# Patient Record
Sex: MALE | Race: WHITE | NOT HISPANIC OR LATINO | URBAN - METROPOLITAN AREA
[De-identification: names, ages, dates, MRNs, and addresses within clinical notes are randomized per-mention and may not be internally consistent; named-entity substitution may affect disease eponyms.]

---

## 2017-01-01 ENCOUNTER — OFFICE VISIT - RIVER FALLS (OUTPATIENT)
Dept: FAMILY MEDICINE | Facility: CLINIC | Age: 82
End: 2017-01-01

## 2017-01-01 ENCOUNTER — COMMUNICATION - RIVER FALLS (OUTPATIENT)
Dept: FAMILY MEDICINE | Facility: CLINIC | Age: 82
End: 2017-01-01

## 2017-01-01 ENCOUNTER — AMBULATORY - RIVER FALLS (OUTPATIENT)
Dept: FAMILY MEDICINE | Facility: CLINIC | Age: 82
End: 2017-01-01

## 2017-01-01 LAB
CREAT SERPL-MCNC: 0.62 MG/DL (ref 0.7–1.18)
GLUCOSE BLD-MCNC: 108 MG/DL (ref 65–99)

## 2017-01-01 ASSESSMENT — MIFFLIN-ST. JEOR
SCORE: 1468.5
SCORE: 1502.06
SCORE: 1482.11
SCORE: 1477.57

## 2022-02-11 VITALS
DIASTOLIC BLOOD PRESSURE: 82 MMHG | WEIGHT: 179 LBS | WEIGHT: 182 LBS | HEART RATE: 76 BPM | SYSTOLIC BLOOD PRESSURE: 150 MMHG | DIASTOLIC BLOOD PRESSURE: 52 MMHG | OXYGEN SATURATION: 95 % | BODY MASS INDEX: 27.58 KG/M2 | WEIGHT: 186.4 LBS | SYSTOLIC BLOOD PRESSURE: 132 MMHG | HEART RATE: 88 BPM | TEMPERATURE: 97 F | HEART RATE: 77 BPM | DIASTOLIC BLOOD PRESSURE: 70 MMHG | TEMPERATURE: 97.1 F | HEIGHT: 68 IN | SYSTOLIC BLOOD PRESSURE: 138 MMHG | BODY MASS INDEX: 28.25 KG/M2 | TEMPERATURE: 98.1 F | DIASTOLIC BLOOD PRESSURE: 76 MMHG | SYSTOLIC BLOOD PRESSURE: 124 MMHG | HEART RATE: 73 BPM | SYSTOLIC BLOOD PRESSURE: 148 MMHG | DIASTOLIC BLOOD PRESSURE: 77 MMHG | BODY MASS INDEX: 27.13 KG/M2 | OXYGEN SATURATION: 99 % | HEIGHT: 68 IN | TEMPERATURE: 98.2 F | HEART RATE: 82 BPM | HEIGHT: 68 IN

## 2022-02-11 VITALS
TEMPERATURE: 96.3 F | DIASTOLIC BLOOD PRESSURE: 66 MMHG | BODY MASS INDEX: 27.43 KG/M2 | HEART RATE: 89 BPM | WEIGHT: 181 LBS | SYSTOLIC BLOOD PRESSURE: 125 MMHG | HEIGHT: 68 IN

## 2022-02-11 VITALS — TEMPERATURE: 97.7 F | HEART RATE: 85 BPM | SYSTOLIC BLOOD PRESSURE: 126 MMHG | DIASTOLIC BLOOD PRESSURE: 79 MMHG

## 2022-02-16 NOTE — CARE COORDINATION
ACTION PLAN   Goal(s): manage chronic conditions    Using [facility] (Emily [phone]) . CC to f/u PRN-Gisela (daughter) calls when she needs something    Today s Date: 8/25/16                                                              Goal(s) completion date: ongoing     Steps to be taken to manage care at home When will I accomplish these steps Barriers Status   (4/13/17) Enrolling in Palliative care        Steps to be taken manage pain When will I accomplish these steps Barriers Status   Tylenol and Tramadol   (4/19/17) Tramadol 50mg q12 hrs rx'd in addition to his Tylenol. F/u due 6/12/17    (5/1/17) Tramadol increased to 50mg TID on 4/28/17     Steps to be taken manage depression When will I accomplish these steps Barriers Status   (4/19/17) Started on Fluoxetine 10mg qd          Steps to be taken manage breathing concerns When will I accomplish these steps Barriers Status   (4/13/17) Gisela states that Emery will have episodes of SOB. Referral placed for Pulm.    (4/19/17) Appt scheduled w/ Dr. Chen in [place] on 5/15/17 at 9am  (CC to make sure we receive records)       Steps to be taken manage Parkinsons When will I accomplish these steps Barriers Status   Continue on Sinemet 25/100 QID. F/u in 3 mo (6/22/17)          Steps to be taken manage BP When will I accomplish these steps Barriers Status   4/17 Per [facility] OK w/ systolic BP's of 160. Cont. Florinef to prevent low BPs.          Steps to be taken to manage knee pain When will I accomplish these steps Barriers Status   4/19 Agreed to ortho referral for possible injection   (4/19/17) Appt scheduled w/ Dr. Ochoa at [facility] on 5/9/17 at 830am    (5/10/17) Appt completed and will be doing steroid injections. Will try to wean down on tramadol. (CC to make sure we receive records of visit)

## 2022-02-16 NOTE — CARE COORDINATION
Gisela called CC today    1. Wants to make sure we received notes from Emery's surgery (we did)    2. Wants a refill for Cymbalta x 90 days. (Filled for # 90 on 6/8 w/ 3 refills    3. Emery seeing [facility] in [place] today and she wants CC to make sure we receive this note for next weeks f/u. (CC will check back on this).    LM for Gisela letting her know above.Spoke to [facility] and requested they fax Emery's records. They will do this.received records from [facility]. Records sent back to be scanned in chart.

## 2022-02-16 NOTE — CARE COORDINATION
ACTION PLAN   Goal(s): manage chronic conditions    Using [facility] (Emily [phone]) . CC to f/u PRN-Gisela (daughter) calls when she needs something    Today s Date: 8/25/16                                                              Goal(s) completion date: ongoing     Steps to be taken to manage care at home When will I accomplish these steps Barriers Status   (4/13/17) Enrolling in Palliative care        Steps to be taken manage pain When will I accomplish these steps Barriers Status   (4/13/17) Currently taking Oxycodone 5mg 1-2 tabs q6 hrs and per Emily had been taking 6-8 tabs of tylenol per day. Requesting a long acting pain med (Oxycontin 10mg qd 12 hrs). Had Gisela set up an appt with [facility] for this (4/17/17)    ***Denies ortho referral at this time***          Steps to be taken manage breathing concerns When will I accomplish these steps Barriers Status   (4/13/17) Gisela states that Emery will have episodes of SOB. Referral placed for Pulm.           Steps to be taken manage Parkinsons When will I accomplish these steps Barriers Status   Continue on Sinemet 25/100 QID. F/u in 3 mo (6/22/17)          Steps to be taken manage BP When will I accomplish these steps Barriers Status   4/13/17 Per [facility] OK w/ systolic BP's of 160. Cont. Florinef to prevent low BPs.

## 2022-02-16 NOTE — NURSING NOTE
PCP:   EDDIE      Time of Call:  _0916       Person Calling:  Pt daughter      Note:   _Spoke to daughter directly. She states pt has been awake crying in pain since 1 am. Tylenol is not helping, may be running a fever. Crying is not typical for pt. Daughter wondering if she should make appt or take pt to ER.  Advised daughter to take pt to ER due to severity of pain and possibly not being able to get appt until later in morning or afternoon. Daughter agreed.  ER was notified of pt coming.

## 2022-02-16 NOTE — PROGRESS NOTES
Patient:   GIACOMO POOLE            MRN: [id number]            FIN: [id number]               Age:   81 years     Sex:  Male     :  1935   Associated Diagnoses:   Hyperlipidemia; Hypertensive disorder; Parkinson disease; Depression; Localized osteoarthritis of both knees   Author:   Lazaro Fisher MD      Visit Information      Date of Service: 2017 11:01 am  Performing Location: [facility]  Encounter#: [id number]      Primary Care Provider (PCP):  Lazaro Fisher MD    NPI# [id number]      Referring Provider:  No referring provider recorded for selected visit.      Chief Complaint   2017 11:03 AM CDT   1. F/u after uro appt. 2. Discuss pain management. Pain in knees and joints-tramadol not helping. Currently taking Tramadol QID, also taking ibuprofen and tylenol.      History of Present Illness   chief complaint and symptoms as noted above confirmed with patient   Struggles with pain controll demetria djd knees and perif neuropathy  Tamadol does not help  Wants oxycodone back  will need uro surgery soon  now has karan         Review of Systems   Constitutional:  Negative except as documented in history of present illness.    Eye:  Negative.    Ear/Nose/Mouth/Throat:  Negative except as documented in history of present illness.    Respiratory:  Negative.    Cardiovascular:  Negative.    Gastrointestinal:  Negative.    Genitourinary:  Negative except as documented in history of present illness.    Musculoskeletal:  Negative except as documented in history of present illness.    Integumentary:  Negative.    Neurologic:  Negative except as documented in history of present illness.              Health Status   Allergies:    Allergic Reactions (Selected)  No Known Medication Allergies   Medications:  (Selected)   Prescriptions  Prescribed  Cymbalta 30 mg oral delayed release capsule: 1 cap(s) ( 30 mg ), po, daily, # 30 cap(s), 1 Refill(s), Type: Maintenance, Pharmacy: [email or web address]  [id number], 1 cap(s) po daily  Florinef Acetate 0.1 mg oral tablet: 1 tab(s) ( 0.1 mg ), PO, Daily, # 90 tab(s), 3 Refill(s), Type: Maintenance, [facility] [address], 1 tab(s) po daily  Nystop 100,000 units/g topical powder: 1 thomas, top, tid, # 30 gm, 11 Refill(s), Type: Maintenance, [facility] [address], 1 thomas top tid  Symbicort 160 mcg-4.5 mcg/inh inhalation aerosol: 2 puff(s), inh, bid, # 3 EA, 1 Refill(s), Type: Maintenance, Pharm[facility] [address], 2 puff(s) inh bid  Ventolin HFA 90 mcg/inh inhalation aerosol: 2 puff(s), inh, q4 hrs, # 3 EA, 1 Refill(s), Type: Maintenance, [facility] [address], 2 puff(s) inh q4 hrs  carbidopa-levodopa 25 mg-100 mg oral tablet: 2 tab(s), po, qid, # 720 tab(s), 1 Refill(s), Type: Maintenance, Pharmacy: [facility] [address], 2 tab(s) po qid  diclofenac 1% topical gel: ( 2 gm ), top, qid, Instructions: not to exceed 32 grams/day, # 240 gm, 3 Refill(s), Type: Maintenance, Pharm[facility] [address], 2 gm top qid,Instr:not to exceed 32 grams/day  fluconazole 150 mg oral tablet: See Instructions, Instructions: TAKE 1 TABLET BY MOUTH ONCE, REPEAT IN 3 DAYS, # 5 tab(s), Type: Soft Stop, Pharmacy: [facility] [address]  gabapentin 400 mg oral capsule: 1 cap(s) ( 400 mg ), po, tid, # 270 cap(s), 1 Refill(s), Type: Soft Stop, Pharmacy: [facility] [address], 1 cap(s) po tid  ketoconazole 2% topical cream: 1 thomas, top, daily, # 60 gm, 11 Refill(s), Type: Maintenance, [facility] [address], 1 thomas top daily  omeprazole 20 mg oral delayed release capsule: 1 cap(s) ( 20 mg ), po, daily, # 90 cap(s), 1 Refill(s), Type: Maintenance, Pharmacy: [facility] [address], 1 cap(s) po daily  oxyCODONE 5 mg oral capsule: 1 cap(s) ( 5 mg ), PO, q6hr, PRN: for pain, # 30 cap(s), 0 Refill(s), Type: Maintenance  sulfamethoxazole-trimethoprim 400 mg-80 mg oral tablet: 1 tab(s), po, daily, # 90 tab(s), 1  Refill(s), Type: Soft Stop, Pharmacy: [facility] [address], 1 tab(s) po daily  Documented Medications  Documented  Benefiber: po, bid, 0 Refill(s), Type: Maintenance  Dulcolax Stool Softener: ( 100 mg ), po, daily, 0 Refill(s), Type: Maintenance  MiraLax: ( 17 gm ), po, daily, PRN: for constipation, 0 Refill(s), Type: Maintenance  Multiple Vitamins oral tablet: 1 tab(s), po, daily, 0 Refill(s), Type: Maintenance  Probiotic Formula oral capsule: 1 cap(s), po, daily, 0 Refill(s), Type: Maintenance  Tylenol Arthritis Caplet: 2 tab(s), po, 1-3x/day, PRN: for pain, 0 Refill(s), Type: Maintenance  aspirin 81 mg oral tablet: 1 tab(s) ( 81 mg ), PO, Daily, # 30 tab(s), 0 Refill(s), Type: Maintenance  melatonin 10 mg oral capsule: 2-3 cap(s), po, hs, 0 Refill(s), Type: Maintenance   Problem list:    All Problems  History of blood transfusion / SNOMED CT 083106254 / Confirmed  History of urinary system disease / SNOMED CT 202849026 / Confirmed  History of elevated PSA / SNOMED CT 217398619 / Confirmed  Nodular prostate without urinary obstruction / SNOMED CT 5854750221 / Confirmed  Urethral stricture / SNOMED CT 213034259 / Confirmed  Stenosis of urinary meatus / SNOMED CT 107391225 / Confirmed  Acne rosacea / SNOMED CT 0608274447 / Confirmed  Actinic keratosis / SNOMED CT 3749375472 / Confirmed  BPH (benign prostatic hypertrophy) / SNOMED CT 507648269 / Confirmed  Chronic obstructive lung disease / SNOMED CT 67949537 / Confirmed  Hyperlipidemia / SNOMED CT 32170410 / Confirmed  Hypertensive disorder / SNOMED CT 7105211032 / Confirmed  Impotence / SNOMED CT 5938261977 / Confirmed  Sensorineural hearing loss / SNOMED CT 562021591 / Confirmed  Tinnitus / SNOMED CT 574289688 / Confirmed  Multilevel spinal stenosis / SNOMED CT 831250858 / Confirmed  Parkinson disease / SNOMED CT 2360829823 / Confirmed  Arthritis / SNOMED CT 6532171661 / Confirmed  Sleep apnea / SNOMED CT 784619773 / Confirmed  Polyneuropathy / SNOMED CT  3976515702 / Confirmed  Depression / SNOMED CT 4260852612 / Confirmed      Histories   Past Medical History:    Active  Nodular prostate without urinary obstruction (5711624049): Onset on 2015 at 79 years.  History of blood transfusion (945635519)  History of urinary system disease (682544531)  History of elevated PSA (024785088)  Urethral stricture (781817179)  Stenosis of urinary meatus (299648549)  Acne rosacea (6953942602)  Actinic keratosis (3299330196)  BPH (benign prostatic hypertrophy) (085626908)  Chronic obstructive lung disease (46997575)  Hyperlipidemia (15923489)  Hypertensive disorder (3370542203)  Impotence (7495819151)  Sensorineural hearing loss (092295611)  Tinnitus (373188099)  Multilevel spinal stenosis (963945482)  Comments:  2016 CST 4:46 PM CST - Nicole Rowe  Most pronounced at L3-4 with grade 1 spondylolisthesis  Arthritis (1205663326)  Sleep apnea (619551980)  Polyneuropathy (9522468654)   Family History:    Lewy body dementia  Sister  High blood pressure  Father ()  Stroke  Sister  Renal failure.....  Mother ()     Procedure history:    urethral dilation on 2012 at 76 Years.  cystoscopy on 2010 at 75 Years.  Cystoscopy (SNOMED CT 93013161) in the month of 2009 at 73 Years.  Colonoscopy (SNOMED CT 271359818) on 10/31/2002 at 66 Years.  Colonoscopy (SNOMED CT 501668008) on 10/31/2002 at 66 Years.   Social History:        Alcohol Assessment            1-2 times per week                     Comments:                      2016 - Khadijah Amin                     1 to 2 per time                       2016 - Lauren Hamm                     4 drinks per day      Tobacco Assessment: Current            Cigarettes      Substance Abuse Assessment: Denies Substance Abuse      Employment and Education Assessment            Retired      Home and Environment Assessment            Marital status: .      Nutrition and Health Assessment             Caffeine intake amount: 2 caffeinated drinks per day.      Exercise and Physical Activity Assessment: Does not exercise        Physical Examination   Vital Signs   5/31/2017 11:03 AM CDT Temperature Tympanic 98.2 DegF    Peripheral Pulse Rate 77 bpm    Systolic Blood Pressure 150 mmHg  HI    Diastolic Blood Pressure 76 mmHg    Mean Arterial Pressure 101 mmHg    BP Site Right arm    Oxygen Saturation 95 %      Measurements from flowsheet : Measurements   5/31/2017 11:03 AM CDT Height Measured - Standard 67.5 in    Weight Measured - Standard 179 lb    BSA 1.96 m2    Body Mass Index 27.62 kg/m2      General:  Alert and oriented, No acute distress, No qualifying data available.   .    Neck:  Supple, Non-tender.    Respiratory:  Lungs are clear to auscultation, Respirations are non-labored.    Cardiovascular:  Normal rate, Regular rhythm.    Gastrointestinal:  Soft.    Musculoskeletal:  degenerative changes noted.    Integumentary:  No rash.    Neurologic:  Alert, Oriented, adv parkinsons.       Impression and Plan   Diagnosis     Hyperlipidemia (OGL87-JS E78.5).     Hypertensive disorder (LXA23-AB I10).     Parkinson disease (WSD23-GX G20).     Depression (YPA84-CK F32.9).     Localized osteoarthritis of both knees (CPO69-EN M17.0).     Course:  Worsening.    Plan:  add cymbalta  oxycodone use reviewed csa signed Discussed [place] etc  wpddb reviewed       30 min spent Face-to-face     .    Patient Instructions:       Counseled: Patient, Family, Regarding diagnosis, Regarding treatment, Regarding medications.

## 2022-02-16 NOTE — PROGRESS NOTES
Patient:   GIACOMO POOLE            MRN: [id number]            FIN: [id number]               Age:   81 years     Sex:  Male     :  1935   Associated Diagnoses:   Hyperlipidemia; Hypertensive disorder; Parkinson disease   Author:   Lazaro Fisher MD      Visit Information      Date of Service: 2017 04:39 pm  Performing Location: [facility]  Encounter#: [id number]      Primary Care Provider (PCP):  Lazaro Fisher MD    NPI# [id number]      Referring Provider:  No referring provider recorded for selected visit.      Chief Complaint   3/8/2017 4:50 PM CST     Pt c/o fatigue, weakness - in wheelchair all the time now. Also last 10 days loose stools - no blood in stool but stool is darker brown than normal. No abdominal pain.        History of Present Illness   chief complaint and symptoms as noted above confirmed with patient   Worsening parkinsons  Overall getting weaker.  No confusion.  His steroid is helping his low blood pressures nicely.  He has had more issues with bowel control unable to tell when he has to go.  He does go twice a day.  Taking medications as directed.  No further bladder infection.          Review of Systems   Constitutional:  Negative except as documented in history of present illness.    Eye:  Negative.    Ear/Nose/Mouth/Throat:  Negative except as documented in history of present illness.    Respiratory:  Negative.    Cardiovascular:  Negative.    Gastrointestinal:  Negative.    Genitourinary:  Negative except as documented in history of present illness.    Musculoskeletal:  Negative except as documented in history of present illness.    Integumentary:  Negative.    Neurologic:  Negative except as documented in history of present illness.              Health Status   Allergies:    Allergic Reactions (Selected)  No Known Medication Allergies   Medications:  (Selected)   Prescriptions  Prescribed  Diflucan 150 mg oral tablet: 1 tab(s) ( 150 mg ), PO, Once,  Instructions: repeat in 3 days, # 1 tab(s), 1 Refill(s), Type: Soft Stop, Pharmacy: [facility] [address], 1 tab(s) po once,Instr:repeat in 3 days  Florinef Acetate 0.1 mg oral tablet: 1 tab(s) ( 0.1 mg ), PO, Daily, # 90 tab(s), 3 Refill(s), Type: Maintenance, Pharmacy: [facility] [address], 1 tab(s) po daily  Nystop 100,000 units/g topical powder: 1 thomas, top, tid, # 30 gm, 11 Refill(s), Type: Maintenance, Pharmacy: [facility] [address], 1 thomas top tid  Symbicort 160 mcg-4.5 mcg/inh inhalation aerosol: 2 puff(s), inh, bid, # 3 EA, 1 Refill(s), Type: Maintenance, Pharmacy: [facility] [address], 2 puff(s) inh bid  Ventolin HFA 90 mcg/inh inhalation aerosol: 2 puff(s), inh, q4 hrs, # 3 EA, 1 Refill(s), Type: Maintenance, Pharmacy: [facility] [address], 2 puff(s) inh q4 hrs  carbidopa-levodopa 25 mg-100 mg oral tablet: 2 tab(s), po, qid, # 720 tab(s), 1 Refill(s), Type: Maintenance, Pharmacy: [facility] [address], 2 tab(s) po qid  diclofenac 1% topical gel: ( 2 gm ), top, qid, Instructions: not to exceed 32 grams/day, # 240 gm, 3 Refill(s), Type: Maintenance, Pharmacy: [facility] [address], 2 gm top qid,Instr:not to exceed 32 grams/day  gabapentin 400 mg oral capsule: 1 cap(s) ( 400 mg ), po, tid, # 270 cap(s), 1 Refill(s), Type: Soft Stop, Pharmacy: [facility] [address], 1 cap(s) po tid  ketoconazole 2% topical cream: 1 thomas, top, daily, # 60 gm, 11 Refill(s), Type: Maintenance, Pharmacy: [facility] [address], 1 thomas top daily  omeprazole 20 mg oral delayed release capsule: 1 cap(s) ( 20 mg ), po, daily, # 90 cap(s), 1 Refill(s), Type: Maintenance, Pharmacy: [facility] [address], 1 cap(s) po daily  sulfamethoxazole-trimethoprim 400 mg-80 mg oral tablet: 1 tab(s), po, daily, # 90 tab(s), 1 Refill(s), Type: Soft Stop, Pharmacy: [facility] [address], 1 tab(s) po daily  Documented Medications  Documented  Benefiber: po, bid, 0 Refill(s), Type:  Maintenance  Dulcolax Stool Softener: ( 100 mg ), po, daily, 0 Refill(s), Type: Maintenance  MiraLax: ( 17 gm ), po, daily, PRN: for constipation, 0 Refill(s), Type: Maintenance  Multiple Vitamins oral tablet: 1 tab(s), po, daily, 0 Refill(s), Type: Maintenance  Probiotic Formula oral capsule: 1 cap(s), po, daily, 0 Refill(s), Type: Maintenance  Tylenol Arthritis Caplet: 2 tab(s), po, 1-3x/day, PRN: for pain, 0 Refill(s), Type: Maintenance  aspirin 81 mg oral tablet: 1 tab(s) ( 81 mg ), PO, Daily, # 30 tab(s), 0 Refill(s), Type: Maintenance  melatonin 10 mg oral capsule: 2-3 cap(s), po, hs, 0 Refill(s), Type: Maintenance   Problem list:    All Problems  History of blood transfusion / SNOMED CT 478928414 / Confirmed  History of urinary system disease / SNOMED CT 202778794 / Confirmed  History of elevated PSA / SNOMED CT 328619974 / Confirmed  Nodular prostate without urinary obstruction / SNOMED CT 4091884625 / Confirmed  Urethral stricture / SNOMED CT 729742475 / Confirmed  Stenosis of urinary meatus / SNOMED CT 261648160 / Confirmed  Acne rosacea / SNOMED CT 7365292219 / Confirmed  Actinic keratosis / SNOMED CT 7934971759 / Confirmed  BPH (benign prostatic hypertrophy) / SNOMED CT 403869920 / Confirmed  Chronic obstructive lung disease / SNOMED CT 22814557 / Confirmed  Hyperlipidemia / SNOMED CT 75662057 / Confirmed  Hypertensive disorder / SNOMED CT 7373017879 / Confirmed  Impotence / SNOMED CT 8931522081 / Confirmed  Sensorineural hearing loss / SNOMED CT 694121006 / Confirmed  Tinnitus / SNOMED CT 127566860 / Confirmed  Multilevel spinal stenosis / SNOMED CT 447200285 / Confirmed  Parkinson disease / SNOMED CT 6640911374 / Confirmed      Histories   Past Medical History:    Active  Nodular prostate without urinary obstruction (9656322967): Onset on 4/7/2015 at 79 years.  History of blood transfusion (963740676)  History of urinary system disease (868438290)  History of elevated PSA (564153778)  Urethral  stricture (619421825)  Stenosis of urinary meatus (708320660)  Acne rosacea (6799197112)  Actinic keratosis (7920949280)  BPH (benign prostatic hypertrophy) (179944702)  Chronic obstructive lung disease (04336151)  Hyperlipidemia (01588422)  Hypertensive disorder (2503025085)  Impotence (8130348496)  Sensorineural hearing loss (360525038)  Tinnitus (710591722)  Multilevel spinal stenosis (159208610)  Comments:  2016 CST 4:46 PM CST - Nicole Rowe  Most pronounced at L3-4 with grade 1 spondylolisthesis   Family History:    High blood pressure  Father ()  Stroke  Sister  Renal failure.....  Mother ()     Procedure history:    urethral dilation on 2012 at 76 Years.  cystoscopy on 2010 at 75 Years.  Cystoscopy (SNOMED CT 70755836) in the month of 2009 at 73 Years.  Colonoscopy (SNOMED CT 707624901) on 10/31/2002 at 66 Years.  Colonoscopy (SNOMED CT 989604804) on 10/31/2002 at 66 Years.   Social History:        Alcohol Assessment            1-2 times per week                     Comments:                      2016 - Khadijah Amin                     1 to 2 per time                       2016 - Lauren Hamm                     4 drinks per day      Tobacco Assessment: Current            Cigarettes      Substance Abuse Assessment: Denies Substance Abuse      Employment and Education Assessment            Retired      Home and Environment Assessment            Marital status: .      Nutrition and Health Assessment            Caffeine intake amount: 2 caffeinated drinks per day.      Exercise and Physical Activity Assessment: Does not exercise        Physical Examination   Vital Signs   3/8/2017 4:50 PM CST Temperature Tympanic 98.1 DegF    Peripheral Pulse Rate 73 bpm    Pulse Site Radial artery    HR Method Manual    Systolic Blood Pressure 138 mmHg    Diastolic Blood Pressure 82 mmHg    Mean Arterial Pressure 101 mmHg    BP Site Right arm    BP Method Manual     Oxygen Saturation 99 %      Measurements from flowsheet : Measurements   3/8/2017 4:50 PM CST     Ht/Wt Measurement Refused by Patient?     Yes     General:  Alert and oriented, No acute distress, No qualifying data available.   .    Neck:  Supple, Non-tender.    Respiratory:  Lungs are clear to auscultation, Respirations are non-labored.    Cardiovascular:  Normal rate, Regular rhythm.    Gastrointestinal:  Soft.    Musculoskeletal:  degenerative changes noted.    Integumentary:  No rash.    Neurologic:  Alert, Oriented, adv parkinsons.       Impression and Plan   Diagnosis     Hyperlipidemia (KHZ74-PP E78.5).     Hypertensive disorder (SDJ77-QG I10).     Parkinson disease (CFN96-TQ G20).     Course:  Worsening.    Plan:  Worsening Parkinson's will set up with neurology for consultation make sure that there are not other issues that are developed.  This weakness will get additional labs.  Talked about toileting program.  Talked about home care.  His leg wound is healing nicely.   .    Patient Instructions:       Counseled: Patient, Family, Regarding diagnosis, Regarding treatment, Regarding medications.

## 2022-02-16 NOTE — CARE COORDINATION
ACTION PLAN   Goal(s): manage chronic conditions    Using [facility] (Emily [phone]) . CC to f/u PRN-Gisela (daughter) calls when she needs something    Today s Date: 8/25/16                                                              Goal(s) completion date: ongoing     Steps to be taken to manage care at home When will I accomplish these steps Barriers Status   (4/13/17) Enrolling in Palliative care        Steps to be taken manage pain When will I accomplish these steps Barriers Status   Cymbalta and Oxycodone   (5/31/17) Rx'd Cymbalta 30mg qd (may need to increase to 60mg). Oxycodone 5mg q 6 hrs PRN. Gisela plans to give Emery 2 tabs qd. Recheck in 1 week.     (6/8/17) Cymbalta increased to 60 mg qd (rx sent in). Try to keep Oxy use to 1-2 tabs per day. Per TFS refill Oxy on Wed (6/14) and let Gisela know when ready to p/u.  F/u in 4-5 weeks     Steps to be taken manage depression When will I accomplish these steps Barriers Status   (4/19/17) Started on Fluoxetine 10mg qd   (5/31/17) TFS stopped Prozac today and started Emery on Cymbalta which hopefully will help w/ mood and pain.    (6/8/17) Cymbalta increased to 60mg qd. F/u in 4-5 weeks       Steps to be taken manage breathing concerns When will I accomplish these steps Barriers Status   (4/13/17) Gisela states that Emery will have episodes of SOB. Referral placed for Pulm.    (4/19/17) Appt scheduled w/ Dr. Chen in [place] on 5/15/17 at 9am  (CC to make sure we receive records)    (5/31/17) Appt completed-dx w/ stricture and stones that will need surgical removal.     (6/8/17) Surgery scheduled w/ Malcolm at [facility] on 6/20       Steps to be taken manage Parkinsons When will I accomplish these steps Barriers Status   Continue on Sinemet 25/100 QID.          Steps to be taken manage BP When will I accomplish these steps Barriers Status   4/17 Per TFS OK w/ systolic BP's of 160. Cont. Florinef to prevent low BPs.          Steps to be taken to manage knee  pain When will I accomplish these steps Barriers Status   4/19 Agreed to ortho referral for possible injection   (4/19/17) Appt scheduled w/ Dr. Ochoa at [facility] on 5/9/17 at 830am    (5/10/17) Appt completed and will be doing steroid injections. Will try to wean down on tramadol. (CC to make sure we receive records of visit)

## 2022-02-16 NOTE — CARE COORDINATION
Pt's daughter called this morning. Said her mom cathed Emery and his urine was dark brown-like coffee. Pt c/o groin pain. CC advised an appt and transferred to schedule.    Robert MANN

## 2022-02-16 NOTE — PROGRESS NOTES
Patient:   GIACOMO POOLE            MRN: [id number]            FIN: [id number]               Age:   81 years     Sex:  Male     :  1935   Associated Diagnoses:   None   Author:   Phillip MELCHOR, Lazaro      Procedure   EKG procedure   Indication: preop.     Position: supine.     EKG findings   Interpretation: by primary care provider.     Rhythm: sinus.     Axis: left axis deviation.     P waves: normal.     ST-T-U complex.     Interpretation: RBBB Left axis deviation, no ST-T wave abnormalities.     Discussed: with patient.     QRS complex absent.        Impression and Plan   Orders

## 2022-02-16 NOTE — PROGRESS NOTES
Patient:   GIACOMO POOLE            MRN: [id number]            FIN: [id number]               Age:   81 years     Sex:  Male     :  1935   Associated Diagnoses:   Hyperlipidemia; Hypertensive disorder; Parkinson disease; Depression; Localized osteoarthritis of both knees   Author:   Lazaro Fisher MD      Visit Information      Date of Service: 2017 07:57 am  Performing Location: [facility]  Encounter#: [id number]      Primary Care Provider (PCP):  Lazaro Fisher MD    NPI# [id number]      Referring Provider:  No referring provider recorded for selected visit.      Chief Complaint   2017 8:06 AM CDT    here to discuss medications.  still waiting for pulminologist referral.        History of Present Illness   chief complaint and symptoms as noted above confirmed with patient   Patient had a recent emergency room visit for knee pain.  Given oxycodone.  He was out of his hydrocodone that he been taking previously.  Very concerned about chronic pain management.  Also issues with depression.  Waiting for visit with pulmonologist per his neurology consult.  Struggling with advancement of his Parkinson s as well.  Most of his lower extremity pain now seems to be knee related although he does have his back issues from his spinal stenosis as failed previous steroid injection in his back.  He lives with his ex-wife who is his caregiver daughter is very involved.  He does self cath is on prophylactic antibiotics for this.  He also is on his inhalers for his COPD.           Review of Systems   Constitutional:  Negative except as documented in history of present illness.    Eye:  Negative.    Ear/Nose/Mouth/Throat:  Negative except as documented in history of present illness.    Respiratory:  Negative.    Cardiovascular:  Negative.    Gastrointestinal:  Negative.    Genitourinary:  Negative except as documented in history of present illness.    Musculoskeletal:  Negative except as  documented in history of present illness.    Integumentary:  Negative.    Neurologic:  Negative except as documented in history of present illness.              Health Status   Allergies:    Allergic Reactions (Selected)  No Known Medication Allergies   Medications:  (Selected)   Prescriptions  Prescribed  Diflucan 150 mg oral tablet: 1 tab(s) ( 150 mg ), PO, Once, Instructions: repeat in 3 days, # 5 tab(s), 1 Refill(s), Type: Soft Stop, Pharmacy: [id number], 1 tab(s) po once,Instr:repeat in 3 days  FLUoxetine 10 mg oral capsule: 1 cap(s) ( 10 mg ), PO, Daily, # 30 cap(s), 11 Refill(s), Type: Maintenance, Pharmacy: [facility] [address], 1 cap(s) po daily  Florinef Acetate 0.1 mg oral tablet: 1 tab(s) ( 0.1 mg ), PO, Daily, # 90 tab(s), 3 Refill(s), Type: Maintenance, Pharmacy: [facility] [address], 1 tab(s) po daily  Nystop 100,000 units/g topical powder: 1 thomas, top, tid, # 30 gm, 11 Refill(s), Type: Maintenance, Pharmacy: [facility] [address], 1 thomas top tid  Symbicort 160 mcg-4.5 mcg/inh inhalation aerosol: 2 puff(s), inh, bid, # 3 EA, 1 Refill(s), Type: Maintenance, Pharmacy: [facility] [address], 2 puff(s) inh bid  Ventolin HFA 90 mcg/inh inhalation aerosol: 2 puff(s), inh, q4 hrs, # 3 EA, 1 Refill(s), Type: Maintenance, Pharmacy: [facility] [address], 2 puff(s) inh q4 hrs  carbidopa-levodopa 25 mg-100 mg oral tablet: 2 tab(s), po, qid, # 720 tab(s), 1 Refill(s), Type: Maintenance, Pharmacy: [facility] [address], 2 tab(s) po qid  diclofenac 1% topical gel: ( 2 gm ), top, qid, Instructions: not to exceed 32 grams/day, # 240 gm, 3 Refill(s), Type: Maintenance, Pharmacy: [facility] [address], 2 gm top qid,Instr:not to exceed 32 grams/day  gabapentin 400 mg oral capsule: 1 cap(s) ( 400 mg ), po, tid, # 270 cap(s), 1 Refill(s), Type: Soft Stop, Pharmacy: [facility] [address], 1 cap(s) po tid  ketoconazole 2% topical cream: 1 thomas, top, daily, # 60 gm, 11  Refill(s), Type: Maintenance, Pharmacy: [facility] [address], 1 thomas top daily  omeprazole 20 mg oral delayed release capsule: 1 cap(s) ( 20 mg ), po, daily, # 90 cap(s), 1 Refill(s), Type: Maintenance, Pharmacy: [facility] [address], 1 cap(s) po daily  sulfamethoxazole-trimethoprim 400 mg-80 mg oral tablet: 1 tab(s), po, daily, # 90 tab(s), 1 Refill(s), Type: Soft Stop, Pharmacy: [facility] [address], 1 tab(s) po daily  traMADol 50 mg oral tablet: 1 tab(s) ( 50 mg ), PO, q12 hrs, PRN: for pain, # 60 tab(s), 1 Refill(s), Type: Maintenance  Documented Medications  Documented  Benefiber: po, bid, 0 Refill(s), Type: Maintenance  Dulcolax Stool Softener: ( 100 mg ), po, daily, 0 Refill(s), Type: Maintenance  MiraLax: ( 17 gm ), po, daily, PRN: for constipation, 0 Refill(s), Type: Maintenance  Multiple Vitamins oral tablet: 1 tab(s), po, daily, 0 Refill(s), Type: Maintenance  Probiotic Formula oral capsule: 1 cap(s), po, daily, 0 Refill(s), Type: Maintenance  Tylenol Arthritis Caplet: 2 tab(s), po, 1-3x/day, PRN: for pain, 0 Refill(s), Type: Maintenance  aspirin 81 mg oral tablet: 1 tab(s) ( 81 mg ), PO, Daily, # 30 tab(s), 0 Refill(s), Type: Maintenance  melatonin 10 mg oral capsule: 2-3 cap(s), po, hs, 0 Refill(s), Type: Maintenance   Problem list:    All Problems  History of blood transfusion / SNOMED CT 599615029 / Confirmed  History of urinary system disease / SNOMED CT 974166675 / Confirmed  History of elevated PSA / SNOMED CT 520603236 / Confirmed  Nodular prostate without urinary obstruction / SNOMED CT 1958204572 / Confirmed  Urethral stricture / SNOMED CT 211622207 / Confirmed  Stenosis of urinary meatus / SNOMED CT 023607773 / Confirmed  Acne rosacea / SNOMED CT 1543152221 / Confirmed  Actinic keratosis / SNOMED CT 5383551517 / Confirmed  BPH (benign prostatic hypertrophy) / SNOMED CT 571504796 / Confirmed  Chronic obstructive lung disease / SNOMED CT 50499326 / Confirmed  Hyperlipidemia /  SNOMED CT 67539380 / Confirmed  Hypertensive disorder / SNOMED CT 8625238935 / Confirmed  Impotence / SNOMED CT 6138894318 / Confirmed  Sensorineural hearing loss / SNOMED CT 292247970 / Confirmed  Tinnitus / SNOMED CT 683047287 / Confirmed  Multilevel spinal stenosis / SNOMED CT 435558696 / Confirmed  Parkinson disease / SNOMED CT 3321634371 / Confirmed  Arthritis / SNOMED CT 9892998570 / Confirmed  Sleep apnea / SNOMED CT 694874737 / Confirmed  Polyneuropathy / SNOMED CT 9393961222 / Confirmed  Depression / SNOMED CT 2336104998 / Confirmed      Histories   Past Medical History:    Active  Nodular prostate without urinary obstruction (5735975002): Onset on 2015 at 79 years.  History of blood transfusion (388576858)  History of urinary system disease (405472087)  History of elevated PSA (886280976)  Urethral stricture (505926335)  Stenosis of urinary meatus (021921286)  Acne rosacea (9751878963)  Actinic keratosis (9364822544)  BPH (benign prostatic hypertrophy) (040584833)  Chronic obstructive lung disease (58246787)  Hyperlipidemia (06865727)  Hypertensive disorder (3514531233)  Impotence (7558918159)  Sensorineural hearing loss (453139522)  Tinnitus (694567599)  Multilevel spinal stenosis (487809930)  Comments:  2016 CST 4:46 PM CST - Arabella Roweri  Most pronounced at L3-4 with grade 1 spondylolisthesis  Arthritis (7888971768)  Sleep apnea (627175213)  Polyneuropathy (5184975465)   Family History:    Lewy body dementia  Sister  High blood pressure  Father ()  Stroke  Sister  Renal failure.....  Mother ()     Procedure history:    urethral dilation on 2012 at 76 Years.  cystoscopy on 2010 at 75 Years.  Cystoscopy (SNOMED CT 85616197) in the month of 2009 at 73 Years.  Colonoscopy (SNOMED CT 961312509) on 10/31/2002 at 66 Years.  Colonoscopy (SNOMED CT 118493809) on 10/31/2002 at 66 Years.   Social History:        Alcohol Assessment            1-2 times per week                      Comments:                      02/11/2016 - Khadijah Amin                     1 to 2 per time                       01/29/2016 - Hansel Lauren                     4 drinks per day      Tobacco Assessment: Current            Cigarettes      Substance Abuse Assessment: Denies Substance Abuse      Employment and Education Assessment            Retired      Home and Environment Assessment            Marital status: .      Nutrition and Health Assessment            Caffeine intake amount: 2 caffeinated drinks per day.      Exercise and Physical Activity Assessment: Does not exercise        Physical Examination   Vital Signs   4/17/2017 8:06 AM CDT Temperature Tympanic 97 DegF  LOW    Peripheral Pulse Rate 76 bpm    Pulse Site Radial artery    HR Method Manual    Systolic Blood Pressure 124 mmHg    Diastolic Blood Pressure 52 mmHg  LOW    Mean Arterial Pressure 76 mmHg    BP Site Right arm    BP Method Manual      General:  Alert and oriented, No acute distress, No qualifying data available.   .    Neck:  Supple, Non-tender.    Respiratory:  Lungs are clear to auscultation, Respirations are non-labored.    Cardiovascular:  Normal rate, Regular rhythm.    Gastrointestinal:  Soft.    Musculoskeletal:  degenerative changes noted, Degenerative changes of both knees noted but no effusion .    Integumentary:  No rash.    Neurologic:  Alert, Oriented, adv parkinsons.       Impression and Plan   Diagnosis     Hyperlipidemia (DYY56-QI E78.5).     Hypertensive disorder (PDB26-RH I10).     Parkinson disease (PZV01-LV G20).     Depression (TJP90-VW F32.9).     Localized osteoarthritis of both knees (ZPU51-IM M17.0).     Course:  Worsening.    Plan:  Chronic pain mostly degenerative in nature I think there is some element of neuropathy as well is on gabapentin for this already.  Will trial a switch on twice a day tramadol in addition to as needed Tylenol get orthopedic surgery consultation for possible injection in his  knees plan on seeing him back in 2 months for his depression we will start fluoxetine.  Discussed side effects and medication risk.  The Florinef seems to have helped his orthostasis significantly    30 min spent Face-to-face     .    Patient Instructions:       Counseled: Patient, Family, Regarding diagnosis, Regarding treatment, Regarding medications.

## 2022-02-16 NOTE — PROGRESS NOTES
Patient:   GIACOMO POOLE            MRN: [id number]            FIN: [id number]               Age:   81 years     Sex:  Male     :  1935   Associated Diagnoses:   Hematuria; Actinic keratosis; Multilevel spinal stenosis; Snoring   Author:   Lazaro Fisher MD      Preoperative Information   Indication for surgery:  BPH Elevated PSA hematuria.    Accompanied by:  Family member.    Source of history:  Self.           Chief Complaint   2017 8:17 AM CDT     Pre-op px. Prostate biopsy-cystoscopy at [facility]/ Dr. Fowler on . Needs some areas on arm froze. Also needs Oxy filled.      Review of Systems   Constitutional:  Negative.    Eye:  Negative.    Ear/Nose/Mouth/Throat:  Negative.    Respiratory:  Negative.    Cardiovascular:  Negative.    Gastrointestinal:  Negative.    Genitourinary:  russo.    Hematology/Lymphatics:  Negative.    Musculoskeletal:  Negative.    Integumentary:  hand lesions.    Neurologic:  parkinsons.       Health Status   Allergies:    Allergic Reactions (Selected)  No Known Medication Allergies   Medications:  (Selected)   Prescriptions  Prescribed  Cymbalta 60 mg oral delayed release capsule: 1 cap(s) ( 60 mg ), po, daily, # 30 cap(s), 11 Refill(s), Type: Maintenance, Pharmacy: [facility] [address], 1 cap(s) po daily  Florinef Acetate 0.1 mg oral tablet: 1 tab(s) ( 0.1 mg ), PO, Daily, # 90 tab(s), 3 Refill(s), Type: Maintenance, Pharmacy: [facility] [address], 1 tab(s) po daily  Nystop 100,000 units/g topical powder: 1 thomas, top, tid, # 30 gm, 11 Refill(s), Type: Maintenance, Pharmacy: [facility] [address], 1 thomas top tid  Symbicort 160 mcg-4.5 mcg/inh inhalation aerosol: 2 puff(s), inh, bid, # 3 EA, 1 Refill(s), Type: Maintenance, Pharmacy: [facility] [address], 2 puff(s) inh bid  Ventolin HFA 90 mcg/inh inhalation aerosol: 2 puff(s), inh, q4 hrs, # 3 EA, 1 Refill(s), Type: Maintenance, Pharmacy: [facility] [address], 2 puff(s) inh q4  hrs  carbidopa-levodopa 25 mg-100 mg oral tablet: 2 tab(s), po, qid, # 720 tab(s), 1 Refill(s), Type: Maintenance, [facility]: [facility], 2 tab(s) po qid  diclofenac 1% topical gel: ( 2 gm ), top, qid, Instructions: not to exceed 32 grams/day, # 240 gm, 3 Refill(s), Type: Maintenance, Pharmacy: [facility], 2 gm top qid,Instr:not to exceed 32 grams/day  fluconazole 150 mg oral tablet: See Instructions, Instructions: TAKE 1 TABLET BY MOUTH ONCE, REPEAT IN 3 DAYS, # 5 tab(s), Type: Soft Stop, Pharmacy: [facility]  gabapentin 400 mg oral capsule: 1 cap(s) ( 400 mg ), po, tid, # 270 cap(s), 1 Refill(s), Type: Soft Stop, Pharmacy: [facility], 1 cap(s) po tid  ketoconazole 2% topical cream: 1 thomas, top, daily, # 60 gm, 11 Refill(s), Type: Maintenance, Pharmacy: [facility], 1 thomas top daily  omeprazole 20 mg oral delayed release capsule: 1 cap(s) ( 20 mg ), po, daily, # 90 cap(s), 1 Refill(s), Type: Maintenance, Pharmacy: [facility], 1 cap(s) po daily  oxyCODONE 5 mg oral capsule: 1 cap(s) ( 5 mg ), PO, q6hr, PRN: for pain, # 30 cap(s), 0 Refill(s), Type: Maintenance  sulfamethoxazole-trimethoprim 400 mg-80 mg oral tablet: 1 tab(s), po, daily, # 90 tab(s), 1 Refill(s), Type: Soft Stop, Pharmacy: [facility], 1 tab(s) po daily  Documented Medications  Documented  Benefiber: po, bid, 0 Refill(s), Type: Maintenance  Dulcolax Stool Softener: ( 100 mg ), po, daily, 0 Refill(s), Type: Maintenance  MiraLax: ( 17 gm ), po, daily, PRN: for constipation, 0 Refill(s), Type: Maintenance  Multiple Vitamins oral tablet: 1 tab(s), po, daily, 0 Refill(s), Type: Maintenance  Probiotic Formula oral capsule: 1 cap(s), po, daily, 0 Refill(s), Type: Maintenance  Tylenol Arthritis Caplet: 2 tab(s), po, 1-3x/day, PRN: for pain, 0 Refill(s), Type: Maintenance  aspirin 81 mg oral tablet: 1 tab(s) ( 81 mg ), PO, Daily, # 30 tab(s), 0  Refill(s), Type: Maintenance  melatonin 10 mg oral capsule: 2-3 cap(s), po, hs, 0 Refill(s), Type: Maintenance   Problem list:    All Problems  History of blood transfusion / SNOMED CT 548112555 / Confirmed  History of urinary system disease / SNOMED CT 144768274 / Confirmed  History of elevated PSA / SNOMED CT 470793246 / Confirmed  Nodular prostate without urinary obstruction / SNOMED CT 0933012825 / Confirmed  Urethral stricture / SNOMED CT 221064274 / Confirmed  Stenosis of urinary meatus / SNOMED CT 470952556 / Confirmed  Acne rosacea / SNOMED CT 8502330540 / Confirmed  Actinic keratosis / SNOMED CT 3242840044 / Confirmed  BPH (benign prostatic hypertrophy) / SNOMED CT 617613719 / Confirmed  Chronic obstructive lung disease / SNOMED CT 94477161 / Confirmed  Hyperlipidemia / SNOMED CT 52410018 / Confirmed  Hypertensive disorder / SNOMED CT 5285231538 / Confirmed  Impotence / SNOMED CT 7949689042 / Confirmed  Sensorineural hearing loss / SNOMED CT 897866870 / Confirmed  Tinnitus / SNOMED CT 458060013 / Confirmed  Multilevel spinal stenosis / SNOMED CT 401603556 / Confirmed  Parkinson disease / SNOMED CT 0817366717 / Confirmed  Arthritis / SNOMED CT 2536928182 / Confirmed  Sleep apnea / SNOMED CT 870803606 / Confirmed  Polyneuropathy / SNOMED CT 6340684284 / Confirmed  Depression / SNOMED CT 4978193885 / Confirmed      Histories   Past Medical History:    Active  Nodular prostate without urinary obstruction (5246679488): Onset on 4/7/2015 at 79 years.  History of blood transfusion (669736951)  History of urinary system disease (791981267)  History of elevated PSA (413780735)  Urethral stricture (932705509)  Stenosis of urinary meatus (349054844)  Acne rosacea (5703133393)  Actinic keratosis (9485349120)  BPH (benign prostatic hypertrophy) (080305344)  Chronic obstructive lung disease (83335460)  Hyperlipidemia (66730940)  Hypertensive disorder (4525808260)  Impotence (8326982334)  Sensorineural hearing loss  (860520021)  Tinnitus (617004097)  Multilevel spinal stenosis (342759890)  Comments:  2016 CST 4:46 PM CST - Jae  Nicole  Most pronounced at L3-4 with grade 1 spondylolisthesis  Arthritis (4929937980)  Sleep apnea (588705482)  Polyneuropathy (7951032686)   Family History:    Lewy body dementia  Sister  High blood pressure  Father ()  Stroke  Sister  Renal failure.....  Mother ()     Procedure history:    urethral dilation on 2012 at 76 Years.  cystoscopy on 2010 at 75 Years.  Cystoscopy (SNOMED CT 54538646) in the month of 2009 at 73 Years.  Colonoscopy (SNOMED CT 373391978) on 10/31/2002 at 66 Years.  Colonoscopy (SNOMED CT 272220737) on 10/31/2002 at 66 Years.   Social History:        Alcohol Assessment            1-2 times per week                     Comments:                      2016 - Khadijah Amin                     1 to 2 per time                       2016 - Lauren Hamm                     4 drinks per day      Tobacco Assessment: Current            Cigarettes      Substance Abuse Assessment: Denies Substance Abuse      Employment and Education Assessment            Retired      Home and Environment Assessment            Marital status: .      Nutrition and Health Assessment            Caffeine intake amount: 2 caffeinated drinks per day.      Exercise and Physical Activity Assessment: Does not exercise       Has a history of anemia.  Has ano history of DVT or pulmonary embolism.  Has no personal history of bleeding problems.   Has  no personal or family history of anesthesia reactions.  Patient does not have active tuberculosis.    S/he  has not taken aspirin or aspirin containing products in the last week.     S/he  has not taken Plavix (Clopidogrel) in the last 2 weeks.    S/he has not taken warfarin in the past week.    S/he has not been on corticosteroids for more than 2 weeks recently.      S/he  is not DNR before, during or after  surgery.    Chest pain / SOB walking up 2 flights of steps? can't  Pain in neck or jaw?  CAD MI?  no  Afib? no  Heart Failure?no  Asthma  or Bronchitis? non  Diabetes? no       Insulin/Orals?   Seizure Disorder? no  CKD?no  Thyroid Disease?no  Liver Diseaseno  CVA?no         Physical Examination   Vital Signs   6/8/2017 8:17 AM CDT Temperature Tympanic 96.3 DegF  LOW    Peripheral Pulse Rate 89 bpm    Systolic Blood Pressure 125 mmHg    Diastolic Blood Pressure 66 mmHg    Mean Arterial Pressure 86 mmHg    BP Site Right arm    BP Method Electronic      Measurements from flowsheet : Measurements   6/8/2017 8:17 AM CDT     Weight Measured - Standard                181 lb     General:  Alert and oriented, No acute distress.    Eye:  Pupils are equal, round and reactive to light, Extraocular movements are intact.    HENT:  Normocephalic, Tympanic membranes are clear, Normal hearing, Oral mucosa is moist.    Neck:  Supple, Non-tender, No carotid bruit, No jugular venous distention, No lymphadenopathy, No thyromegaly.    Respiratory:  Lungs are clear to auscultation, Respirations are non-labored, Breath sounds are equal.    Cardiovascular:  Normal rate, Regular rhythm, No murmur, Good pulses equal in all extremities, No edema.    Gastrointestinal:  Soft, Non-tender, Non-distended, Normal bowel sounds, No organomegaly.    Musculoskeletal:  degenerative changes noted.    Integumentary:  Warm, Dry, 4  1cm areas ak left hand  frozen x3 with liquid nitrogen.    Neurologic:  Alert, Oriented, No focal deficits, Cranial Nerves II-XII are grossly intact.    Psychiatric:  Cooperative, Appropriate mood & affect, Normal judgment.       Impression and Plan   Diagnosis     Hematuria (NDP22-YD R31.9).     Condition:  ok for procedure asa2.    Diagnosis     Actinic keratosis (YBK56-FJ L57.0).     Multilevel spinal stenosis (HES41-WI M48.00).     Snoring (CMA80-TP R06.83).     Course:  Improving, skin care reviewed  will increase  cymbalta  try to keep oxy 1-2 a day,   Indications for sleep study are: gasping/choking in sleep, disturbed/restless sleep, unrefreshing/non-restorative sleep, loud/chronic snoring w/ disrupted sleep, excessive fatigue, restless legs, and HTN. Needs a home sleep study d/t difficulty ambulating.  .

## 2022-02-16 NOTE — CARE COORDINATION
ACTION PLAN   Goal(s): manage chronic conditions    Using [facility] (Emily [phone]) . CC to f/u PRN-Gisela (daughter) calls when she needs something    Today s Date: 8/25/16                                                              Goal(s) completion date: ongoing     Steps to be taken to manage care at home When will I accomplish these steps Barriers Status   (4/13/17) Enrolling in Palliative care        Steps to be taken manage pain When will I accomplish these steps Barriers Status   Cymbalta and Oxycodone   (5/31/17) Rx'd Cymbalta 30mg qd (may need to increase to 60mg). Oxycodone 5mg q 6 hrs PRN. Gisela plans to give Emery 2 tabs qd. Recheck in 1 week.      Steps to be taken manage depression When will I accomplish these steps Barriers Status   (4/19/17) Started on Fluoxetine 10mg qd   (5/31/17) TFS stopped Prozac today and started Emery on Cymbalta which hopefully will help w/ mood and pain.       Steps to be taken manage breathing concerns When will I accomplish these steps Barriers Status   (4/13/17) Gisela states that Emery will have episodes of SOB. Referral placed for Pulm.    (4/19/17) Appt scheduled w/ Dr. Chen in [place] on 5/15/17 at 9am  (CC to make sure we receive records)    (5/31/17) Appt completed-dx w/ stricture and stones that will need surgical removal.        Steps to be taken manage Parkinsons When will I accomplish these steps Barriers Status   Continue on Sinemet 25/100 QID. F/u in 3 mo (6/22/17)          Steps to be taken manage BP When will I accomplish these steps Barriers Status   4/17 Per TFS OK w/ systolic BP's of 160. Cont. Florinef to prevent low BPs.          Steps to be taken to manage knee pain When will I accomplish these steps Barriers Status   4/19 Agreed to ortho referral for possible injection   (4/19/17) Appt scheduled w/ Dr. Ochoa at [facility] on 5/9/17 at 830am    (5/10/17) Appt completed and will be doing steroid injections. Will try to wean  down on tramadol. (CC to make sure we receive records of visit)

## 2022-02-16 NOTE — CARE COORDINATION
Emily from [facility] is sending over requests for IMELDA to sign for palliative care and also address pain management.    But she feels it would be best for Emery to be seen by Dr. SEGURA soon to address and prioritize what future visits are important and needed to help with quality of life.    I left a detailed message in identified machine asking Gisela to schedule an appointment to see Dr. SEGURA soon.

## 2022-02-16 NOTE — PROGRESS NOTES
Patient:   GIACOMO POOLE            MRN: [id number]            FIN: [id number]               Age:   81 years     Sex:  Male     :  1935   Associated Diagnoses:   Actinic keratosis; Dark brown urine; Groin pain; Blood in the urine   Author:   Lazaro Fisher MD      Visit Information      Date of Service: 2017 11:24 am  Performing Location: [facility]  Encounter#: [id number]      Primary Care Provider (PCP):  Lazaro Fisher MD    NPI# [id number]      Referring Provider:  No referring provider recorded for selected visit.      Chief Complaint   2017 11:27 AM CDT   Pt here for dark brown urine since this AM-looks like coffee per family. Also c/o groin pain. Wondering if he could have 2 areas on his L hand frozen.      History of Present Illness   chief complaint and symptoms as noted above confirmed with patient   groin pain better  feels good otherwise  self caths      Review of Systems   Constitutional:  Negative except as documented in history of present illness.    Respiratory:  Negative.    Cardiovascular:  Negative.    Gastrointestinal:  Negative.    Genitourinary:  Negative except as documented in history of present illness.    Integumentary:  Negative except as documented in history of present illness.    Neurologic:  Negative.             Health Status   Allergies:    Allergic Reactions (Selected)  No Known Medication Allergies   Medications:  (Selected)   Prescriptions  Prescribed  Diflucan 150 mg oral tablet: 1 tab(s) ( 150 mg ), PO, Once, Instructions: repeat in 3 days, # 5 tab(s), 1 Refill(s), Type: Soft Stop, Pharmacy: [facility] [id number], 1 tab(s) po once,Instr:repeat in 3 days  FLUoxetine 10 mg oral capsule: 1 cap(s) ( 10 mg ), PO, Daily, # 30 cap(s), 11 Refill(s), Type: Maintenance, Pharmacy: [facility] [id number], 1 cap(s) po daily  Florinef Acetate 0.1 mg oral tablet: 1 tab(s) ( 0.1 mg ), PO, Daily, # 90 tab(s), 3 Refill(s), Type: Maintenance,  Pharmacy: [facility] [id number], 1 tab(s) po daily  Nystop 100,000 units/g topical powder: 1 thomas, top, tid, # 30 gm, 11 Refill(s), Type: Maintenance, Pharmacy: [facility] [address], 1 thomas top tid  Symbicort 160 mcg-4.5 mcg/inh inhalation aerosol: 2 puff(s), inh, bid, # 3 EA, 1 Refill(s), Type: Maintenance, Pharmacy: [facility] [id number], 2 puff(s) inh bid  Ventolin HFA 90 mcg/inh inhalation aerosol: 2 puff(s), inh, q4 hrs, # 3 EA, 1 Refill(s), Type: Maintenance, Pharmacy: [facility], 2 puff(s) inh q4 hrs  carbidopa-levodopa 25 mg-100 mg oral tablet: 2 tab(s), po, qid, # 720 tab(s), 1 Refill(s), Type: Maintenance, Pharmacy: [facility] [address], 2 tab(s) po qid  diclofenac 1% topical gel: ( 2 gm ), top, qid, Instructions: not to exceed 32 grams/day, # 240 gm, 3 Refill(s), Type: Maintenance, Pharmacy: [facility] [address], 2 gm top qid,Instr:not to exceed 32 grams/day  gabapentin 400 mg oral capsule: 1 cap(s) ( 400 mg ), po, tid, # 270 cap(s), 1 Refill(s), Type: Soft Stop, Pharmacy: [facility] [address], 1 cap(s) po tid  ketoconazole 2% topical cream: 1 thomas, top, daily, # 60 gm, 11 Refill(s), Type: Maintenance, Pharmacy: [facility] [id number], 1 thomas top daily  omeprazole 20 mg oral delayed release capsule: 1 cap(s) ( 20 mg ), po, daily, # 90 cap(s), 1 Refill(s), Type: Maintenance, Pharmacy: [facility] [address], 1 cap(s) po daily  sulfamethoxazole-trimethoprim 400 mg-80 mg oral tablet: 1 tab(s), po, daily, # 90 tab(s), 1 Refill(s), Type: Soft Stop, [id number], 1 tab(s) po daily  traMADol 50 mg oral tablet: 1 tab(s) ( 50 mg ), PO, tid, PRN: for pain, # 60 tab(s), 0 Refill(s), Type: Maintenance, called to pharmacy (Rx), note increase from q12 hrs to TID prn pain  Documented Medications  Documented  Benefiber: po, bid, 0 Refill(s), Type: Maintenance  Dulcolax Stool Softener: ( 100 mg ), po, daily, 0 Refill(s), Type:  Maintenance  MiraLax: ( 17 gm ), po, daily, PRN: for constipation, 0 Refill(s), Type: Maintenance  Multiple Vitamins oral tablet: 1 tab(s), po, daily, 0 Refill(s), Type: Maintenance  Probiotic Formula oral capsule: 1 cap(s), po, daily, 0 Refill(s), Type: Maintenance  Tylenol Arthritis Caplet: 2 tab(s), po, 1-3x/day, PRN: for pain, 0 Refill(s), Type: Maintenance  aspirin 81 mg oral tablet: 1 tab(s) ( 81 mg ), PO, Daily, # 30 tab(s), 0 Refill(s), Type: Maintenance  melatonin 10 mg oral capsule: 2-3 cap(s), po, hs, 0 Refill(s), Type: Maintenance   Problem list:    All Problems  History of blood transfusion / SNOMED CT 466568231 / Confirmed  History of urinary system disease / SNOMED CT 285136449 / Confirmed  History of elevated PSA / SNOMED CT 857560377 / Confirmed  Nodular prostate without urinary obstruction / SNOMED CT 6839767424 / Confirmed  Urethral stricture / SNOMED CT 841826708 / Confirmed  Stenosis of urinary meatus / SNOMED CT 013759903 / Confirmed  Acne rosacea / SNOMED CT 2762892641 / Confirmed  Actinic keratosis / SNOMED CT 1402735066 / Confirmed  BPH (benign prostatic hypertrophy) / SNOMED CT 664656003 / Confirmed  Chronic obstructive lung disease / SNOMED CT 96832662 / Confirmed  Hyperlipidemia / SNOMED CT 21074932 / Confirmed  Hypertensive disorder / SNOMED CT 8784276016 / Confirmed  Impotence / SNOMED CT 2682705085 / Confirmed  Sensorineural hearing loss / SNOMED CT 493569699 / Confirmed  Tinnitus / SNOMED CT 007785460 / Confirmed  Multilevel spinal stenosis / SNOMED CT 297509897 / Confirmed  Parkinson disease / SNOMED CT 1582639371 / Confirmed  Arthritis / SNOMED CT 0416112539 / Confirmed  Sleep apnea / SNOMED CT 780355251 / Confirmed  Polyneuropathy / SNOMED CT 5426332380 / Confirmed  Depression / SNOMED CT 6482484740 / Confirmed      Histories   Past Medical History:    Active  Nodular prostate without urinary obstruction (9150761406): Onset on 4/7/2015 at 79 years.  History of blood transfusion  (214211941)  History of urinary system disease (114878096)  History of elevated PSA (917677306)  Urethral stricture (085678406)  Stenosis of urinary meatus (871190560)  Acne rosacea (8580139145)  Actinic keratosis (5750137461)  BPH (benign prostatic hypertrophy) (862911417)  Chronic obstructive lung disease (73678715)  Hyperlipidemia (59055740)  Hypertensive disorder (8738198484)  Impotence (5265841263)  Sensorineural hearing loss (225737866)  Tinnitus (821222849)  Multilevel spinal stenosis (757116055)  Comments:  2016 CST 4:46 PM CST - Nicole Rowe  Most pronounced at L3-4 with grade 1 spondylolisthesis  Arthritis (6852004343)  Sleep apnea (536244550)  Polyneuropathy (8170043381)   Family History:    Lewy body dementia  Sister  High blood pressure  Father ()  Stroke  Sister  Renal failure.....  Mother ()     Procedure history:    urethral dilation on 2012 at 76 Years.  cystoscopy on 2010 at 75 Years.  Cystoscopy (SNOMED CT 44620720) in the month of 2009 at 73 Years.  Colonoscopy (SNOMED CT 088879914) on 10/31/2002 at 66 Years.  Colonoscopy (SNOMED CT 511084429) on 10/31/2002 at 66 Years.   Social History:        Alcohol Assessment            1-2 times per week                     Comments:                      2016 - Khadijah Amin                     1 to 2 per time                       2016 - Lauren Hamm                     4 drinks per day      Tobacco Assessment: Current            Cigarettes      Substance Abuse Assessment: Denies Substance Abuse      Employment and Education Assessment            Retired      Home and Environment Assessment            Marital status: .      Nutrition and Health Assessment            Caffeine intake amount: 2 caffeinated drinks per day.      Exercise and Physical Activity Assessment: Does not exercise        Physical Examination   Vital Signs   2017 11:27 AM CDT Temperature Tympanic 97.1 DegF  LOW    Peripheral  Pulse Rate 88 bpm    Systolic Blood Pressure 132 mmHg    Diastolic Blood Pressure 70 mmHg    Mean Arterial Pressure 91 mmHg    BP Site Right arm      Measurements from flowsheet : Measurements   5/11/2017 11:27 AM CDT Height Measured - Standard 67.5 in    Weight Measured - Standard 182 lb    BSA 1.98 m2    Body Mass Index 28.08 kg/m2      General:  Alert and oriented, No acute distress.    Respiratory:  Respirations are non-labored.    Cardiovascular:  Normal rate, Regular rhythm.    Gastrointestinal:  Soft, Non-tender.    Integumentary:  3 1cm areas ak left hand  frozen x3 with liquid nitrogen.    Neurologic:  Alert, Oriented.       Review / Management   Results review:  Lab results   5/11/2017 11:54 AM CDT UA Color Brown    UA Clarity Cloudy    UA pH 7.0    UA Specific Gravity 1.015    UA Glucose Negative mg/dL    UA Bilirubin Abnormal    UA Ketones Negative mg/dL    Urine Occult Blood Large    UA Protein 100 mg/dL    UA Nitrite Positive    UA Leukocyte Esterase Small    UA Urobilinogen Increased    UA Epithelial Cells Few    UA WBC 6-10    UA RBC >100    UA Bacteria Moderate   3/8/2017 5:24 PM CST Sodium Level 138 mmol/L    Potassium Level 4.0 mmol/L    Chloride Level 103 mmol/L    CO2 Level 27 mmol/L    Glucose Level 88 mg/dL    BUN 14 mg/dL    Creatinine Level 0.59 mg/dL  LOW    BUN/Creat Ratio 24  HI    eGFR 96 mL/min/1.73m2    eGFR African American 112 mL/min/1.73m2    Calcium Level 9.7 mg/dL    Bilirubin Total 0.4 mg/dL    Alkaline Phosphatase 67 unit/L    AST/SGOT 17 unit/L    ALT/SGPT 7 unit/L  LOW    Protein Total 7.4 gm/dL    Albumin Level 4.2 gm/dL    Globulin 3.2    A/G Ratio 1.3    TSH 0.28 mIU/L  LOW    WBC 8.7    RBC 4.27    Hgb 12.9 gm/dL  LOW    Hct 39.2 %    MCV 91.8 fL    MCH 30.1 pg    MCHC 32.8 gm/dL    RDW 16.6 %  HI    Platelet 317    MPV 8.6 fL    Lymphocytes 23.4 %    Abs Lymphocytes 2,036    Neutrophils 65.7 %    Abs Neutrophils 5,716    Monocytes 8.9 %    Abs Monocytes 774    Eosinophils  2.0 %    Abs Eosinophils 174    Basophils 0.0 %    Abs Basophils 0    Sed Rate 29  HI   3/3/2017 10:35 AM CST UA Color Yellow    UA Clarity Slightly Cloudy    UA pH 7.0    UA Specific Gravity 1.015    UA Glucose Negative mg/dL    UA Bilirubin Negative    UA Ketones Negative mg/dL    Urine Occult Blood Negative    UA Protein Trace mg/dL    UA Nitrite Positive    UA Leukocyte Esterase Large    UA Urobilinogen Normal    UA Epithelial Cells None Seen    UA Amorphous Present    UA WBC 6-10    UA RBC 0-2    UA Calcium Oxalate Crystals Present    UA Triple Phosphate Crystals Present    UA Bacteria Many   3/3/2017 7:34 AM CST Culture Urine See comment   2/3/2017 10:03 AM CST UA Color DARK YELLOW    UA Appear CLOUDY    UA pH 7.5    UA Specific Gravity 1.020    UA Glucose NEGATIVE    UA Bilirubin NEGATIVE    UA Ketones NEGATIVE    UA Blood 2+    UA Protein 1+    UA Nitrite POSITIVE    UA Leukocyte Esterase 1+    UA Squamous Epithelial Cells NONE SEEN /HPF    UA Sediment Elements FEW /HPF    UA Hyaline Cast NONE SEEN /LPF    UA WBC 20-40 /HPF    UA RBC 20-40 /HPF    UA Triple Phosphate Crystals FEW /HPF    UA Bacteria MANY /HPF   2/3/2017 10:00 AM CST Culture Urine See comment   .       Impression and Plan   Diagnosis     Actinic keratosis (LKR71-TI L57.0).     Dark brown urine (ZZV86-TT R82.99).     Groin pain (KWG48-UA R10.30).     Blood in the urine (CSD44-FR R31.9).     Course:  Not progressing as expected.    Plan:  urology referral.    Patient Instructions:       Counseled: Patient, Family, Regarding diagnosis.

## 2022-02-16 NOTE — CARE COORDINATION
ACTION PLAN   Goal(s): manage chronic conditions    Using [facility] (Emily [phone]) . CC to f/u PRN-Gisela (daughter) calls when she needs something    Today s Date: 8/25/16                                                              Goal(s) completion date: ongoing     Steps to be taken to manage care at home When will I accomplish these steps Barriers Status   (4/13/17) Enrolling in Palliative care        Steps to be taken manage pain When will I accomplish these steps Barriers Status   Tylenol and Tramadol   (4/19/17) Tramadol 50mg q12 hrs rx'd in addition to his Tylenol. F/u due 6/12/17    (5/1/17) Tramadol increased to 50mg TID on 4/28/17     Steps to be taken manage depression When will I accomplish these steps Barriers Status   (4/19/17) Started on Fluoxetine 10mg qd          Steps to be taken manage breathing concerns When will I accomplish these steps Barriers Status   (4/13/17) Gisela states that Emery will have episodes of SOB. Referral placed for Pulm.    (4/19/17) Appt scheduled w/ Dr. Chen in [place] on 5/15/17 at 9am       Steps to be taken manage Parkinsons When will I accomplish these steps Barriers Status   Continue on Sinemet 25/100 QID. F/u in 3 mo (6/22/17)          Steps to be taken manage BP When will I accomplish these steps Barriers Status   4/17 Per TFS OK w/ systolic BP's of 160. Cont. Florinef to prevent low BPs.          Steps to be taken to manage knee pain When will I accomplish these steps Barriers Status   4/19 Agreed to ortho referral for possible injection   (4/19/17) Appt scheduled w/ Dr. Ochoa at [facility] on 5/9/17 at 830am

## 2022-02-16 NOTE — PROGRESS NOTES
Patient:   GIACOMO POOLE            MRN: [id number]            FIN: [id number]               Age:   81 years     Sex:  Male     :  1935   Associated Diagnoses:   Bacterial UTI; BPH (benign prostatic hypertrophy); Cerumen impaction; Hyperlipidemia; Hypertensive disorder; Parkinson disease   Author:   Lazaro Fisher MD      Visit Information      Date of Service: 2017 07:55 am  Performing Location: [facility]  Encounter#: [id number]      Primary Care Provider (PCP):  Lazaro Fisher MD    NPI# [id number]      Referring Provider:  No referring provider recorded for selected visit.      Chief Complaint   2/3/2017 8:01 AM CST     6 month HTN check up.  Prostate check        History of Present Illness   chief complaint and symptoms as noted above confirmed with patient   In for six-month follow-up.  Prefer not to see his neurologist or urologist.  He is followed for his Parkinson s disease as well as for his BPH and chronic recurrent UTIs he does self cath and is on sulfa prophylaxis.  He continues to have problems with falls.  His blood pressure will drop frequently into the 80s when he stands which causes lightheadedness and balance issues.  He does use 1 Vicodin in the morning for his knees.          Review of Systems   Constitutional:  Negative except as documented in history of present illness.    Eye:  Negative.    Ear/Nose/Mouth/Throat:  Negative except as documented in history of present illness.    Respiratory:  Negative.    Cardiovascular:  Negative.    Gastrointestinal:  Negative.    Genitourinary:  Negative except as documented in history of present illness.    Musculoskeletal:  Negative except as documented in history of present illness.    Integumentary:  Negative.    Neurologic:  Negative except as documented in history of present illness.             Health Status   Allergies:    Allergic Reactions (Selected)  No Known Medication Allergies   Medications:  (Selected)    Prescriptions  Prescribed  Diflucan 150 mg oral tablet: 1 tab(s) ( 150 mg ), PO, Once, Instructions: repeat in 3 days, # 2 tab(s), 0 Refill(s), Type: Soft Stop, Pharmacy: [facility] [place] [id number], 1 tab(s) po once,Instr:repeat in 3 days  Florinef Acetate 0.1 mg oral tablet: 1 tab(s) ( 0.1 mg ), PO, Daily, # 90 tab(s), 3 Refill(s), Type: Maintenance, [facility], 1 tab(s) po daily  Nystop 100,000 units/g topical powder: 1 thomas, top, tid, # 30 gm, 11 Refill(s), Type: Maintenance, [facility], 1 thomas top tid  Ventolin HFA 90 mcg/inh inhalation aerosol: See Instructions, Instructions: INHALE 2 PUFFS BY MOUTH EVERY 4 HOURS AS NEEDED FOR WHEEZING OR SHORTNESS OF BREATH, DO NOT USE MORE THAN 12 PUFFS IN 24 HOURS, # 18 gm, 2 Refill(s), Type: Maintenance, [facility] [id number]  carbidopa-levodopa 25 mg-100 mg oral tablet: 2 tab(s), po, qid, # 720 tab(s), 0 Refill(s), Type: Maintenance, Pharmacy: [facility], 2 tab(s) po qid,x90 day(s)  diclofenac 1% topical gel: ( 2 gm ), top, qid, Instructions: not to exceed 32 grams/day, # 240 gm, 3 Refill(s), Type: Maintenance, [facility], 2 gm top qid,Instr:not to exceed 32 grams/day  gabapentin 400 mg oral capsule: See Instructions, Instructions: TAKE ONE CAPSULE BY MOUTH THREE TIMES DAILY, # 270 cap(s), 3 Refill(s), Type: Soft Stop, [facility] [id number], TAKE ONE CAPSULE BY MOUTH THREE TIMES DAILY  ketoconazole 2% topical cream: 1 thomas, top, daily, # 60 gm, 11 Refill(s), Type: Maintenance, Pharmacy: [facility] [id number], 1 thomas top daily  omeprazole 20 mg oral delayed release capsule: 1 cap(s) ( 20 mg ), po, daily, Instructions: TAKE ONE CAPSULE BY MOUTH DAILY, # 90 cap(s), 0 Refill(s), Type: Maintenance, Pharmacy: [facility] [id number]  sulfamethoxazole-trimethoprim 400 mg-80 mg oral tablet: 1 tab(s), po, daily, # 90 tab(s), 0 Refill(s), Type: Soft Stop,  Pharmacy: [facility] [id number], 1 tab(s) po daily  Documented Medications  Documented  Dulcolax Stool Softener: ( 100 mg ), po, daily, 0 Refill(s), Type: Maintenance  MiraLax: ( 17 gm ), po, daily, PRN: for constipation, 0 Refill(s), Type: Maintenance  Multiple Vitamins oral tablet: 1 tab(s), po, daily, 0 Refill(s), Type: Maintenance  Probiotic Formula oral capsule: 1 cap(s), po, daily, 0 Refill(s), Type: Maintenance  Symbicort 160 mcg-4.5 mcg/inh inhalation aerosol: 2 puff(s), inh, bid, 0 Refill(s), Type: Maintenance  Tylenol Arthritis Caplet: 2 tab(s), po, 1-3x/day, PRN: for pain, 0 Refill(s), Type: Maintenance  aspirin 81 mg oral tablet: 1 tab(s) ( 81 mg ), PO, Daily, # 30 tab(s), 0 Refill(s), Type: Maintenance  melatonin 10 mg oral capsule: 2-3 cap(s), po, hs, 0 Refill(s), Type: Maintenance   Problem list:    All Problems  History of blood transfusion / SNOMED CT 625703746 / Confirmed  History of urinary system disease / SNOMED CT 395130955 / Confirmed  History of elevated PSA / SNOMED CT 602592574 / Confirmed  Nodular prostate without urinary obstruction / SNOMED CT 2211802256 / Confirmed  Urethral stricture / SNOMED CT 918001302 / Confirmed  Stenosis of urinary meatus / SNOMED CT 520532895 / Confirmed  Acne rosacea / SNOMED CT 9280963100 / Confirmed  Actinic keratosis / SNOMED CT 1441746003 / Confirmed  BPH (benign prostatic hypertrophy) / SNOMED CT 530983164 / Confirmed  Chronic obstructive lung disease / SNOMED CT 61120979 / Confirmed  Hyperlipidemia / SNOMED CT 98245899 / Confirmed  Hypertensive disorder / SNOMED CT 1286738438 / Confirmed  Impotence / SNOMED CT 8241893538 / Confirmed  Sensorineural hearing loss / SNOMED CT 793877491 / Confirmed  Tinnitus / SNOMED CT 367198850 / Confirmed  Multilevel spinal stenosis / SNOMED CT 391828978 / Confirmed  Parkinson disease / SNOMED CT 6935068675 / Confirmed      Histories   Past Medical History:    Active  Nodular prostate without urinary obstruction  (0908335925): Onset on 2015 at 79 years.  History of blood transfusion (289521298)  History of urinary system disease (834375180)  History of elevated PSA (038968957)  Urethral stricture (702415846)  Stenosis of urinary meatus (336238204)  Acne rosacea (7093616981)  Actinic keratosis (1976928635)  BPH (benign prostatic hypertrophy) (880686911)  Chronic obstructive lung disease (38183687)  Hyperlipidemia (49571173)  Hypertensive disorder (7238178315)  Impotence (5127528169)  Sensorineural hearing loss (208081794)  Tinnitus (069003067)  Multilevel spinal stenosis (451996157)  Comments:  2016 CST 4:46 PM CST - Nicole Rowe  Most pronounced at L3-4 with grade 1 spondylolisthesis   Family History:    High blood pressure  Father ()  Stroke  Sister  Renal failure.....  Mother ()     Procedure history:    urethral dilation on 2012 at 76 Years.  cystoscopy on 2010 at 75 Years.  Cystoscopy (SNOMED CT 57748293) in the month of 2009 at 73 Years.  Colonoscopy (SNOMED CT 143801652) on 10/31/2002 at 66 Years.  Colonoscopy (SNOMED CT 772318610) on 10/31/2002 at 66 Years.   Social History:        Alcohol Assessment            1-2 times per week                     Comments:                      2016 - Khadijah Amin                     1 to 2 per time                       2016 - Lauren Hamm                     4 drinks per day      Tobacco Assessment: Current            Cigarettes      Substance Abuse Assessment: Denies Substance Abuse      Employment and Education Assessment            Retired      Home and Environment Assessment            Marital status: .      Nutrition and Health Assessment            Caffeine intake amount: 2 caffeinated drinks per day.      Exercise and Physical Activity Assessment: Does not exercise        Physical Examination   Vital Signs   2/3/2017 8:01 AM CST Temperature Tympanic 97.7 DegF  LOW    Peripheral Pulse Rate 85 bpm    Systolic Blood  Pressure 126 mmHg    Diastolic Blood Pressure 79 mmHg    Mean Arterial Pressure 95 mmHg      Measurements from flowsheet : Measurements   2/3/2017 8:01 AM CST     Ht/Wt Measurement Refused by Patient?     Yes     General:  Alert and oriented, No acute distress, No qualifying data available.   .    HENT:  Both ear canals occluded with cerumen irrigated until clear by MA .    Neck:  Supple, Non-tender.    Respiratory:  Lungs are clear to auscultation, Respirations are non-labored.    Cardiovascular:  Normal rate, Regular rhythm.    Gastrointestinal:  Soft.    Musculoskeletal:  degenerative changes noted.    Integumentary:  No rash.    Neurologic:  Alert, Oriented.       Impression and Plan   Diagnosis     Bacterial UTI (ORA16-FX N39.0).     BPH (benign prostatic hypertrophy) (DCZ55-GL N40.0).     Cerumen impaction (ARD02-EX H61.20).     Hyperlipidemia (TEA43-NK E78.5).     Hypertensive disorder (EQJ60-DK I10).     Parkinson disease (BZH71-KJ G20).     Course:  Worsening.    Plan:  Will trial off Vicodin daily and only as needed a few times a week start Florinef and low-dose daily to see if it helps his orthostatic issues try diclofenac topically to his knees.  Check labs today follow-up in 6 months.  We did discuss suprapubic catheters he wants to wait.   .    Patient Instructions:       Counseled: Patient, Family, Regarding diagnosis, Regarding treatment, Regarding medications.

## 2022-03-07 NOTE — CARE COORDINATION
ACTION PLAN   Goal(s): manage chronic conditions    Using [facility] (Emily [phone]) . CC to f/u PRN-Gisela (daughter) calls when she needs something    Today s Date: 8/25/16                                                              Goal(s) completion date: ongoing     Steps to be taken to manage care at home When will I accomplish these steps Barriers Status   (4/13/17) Enrolling in Palliative care        Steps to be taken manage pain When will I accomplish these steps Barriers Status   Tylenol and Tramadol   (4/19/17) Tramadol 50mg q12 hrs rx'd in addition to his Tylenol. F/u due 6/12/17     Steps to be taken manage depression When will I accomplish these steps Barriers Status   (4/19/17) Started on Fluoxetine 10mg qd          Steps to be taken manage breathing concerns When will I accomplish these steps Barriers Status   (4/13/17) Gisela states that Emery will have episodes of SOB. Referral placed for Pulm.    (4/19/17) Appt scheduled w/ Dr. Chen in [place] on 5/15/17 at 9am       Steps to be taken manage Parkinsons When will I accomplish these steps Barriers Status   Continue on Sinemet 25/100 QID. F/u in 3 mo (6/22/17)          Steps to be taken manage BP When will I accomplish these steps Barriers Status   4/17 Per TFS OK w/ systolic BP's of 160. Cont. Florinef to prevent low BPs.          Steps to be taken to manage knee pain When will I accomplish these steps Barriers Status   4/19 Agreed to ortho referral for possible injection   (4/19/17) Appt scheduled w/ Dr. Ochoa at [facility] on 5/9/17 at 830am   Yes

## 2022-05-23 NOTE — CARE COORDINATION
Gisela stopped into the clinic.    Many questions in regards to her dad's care.      They have recently seen neurology and were told that he needed to see a pulmonologist but have not heard back from anyone so she was wondering if that referral was placed.      Neurologist also wanted him to have his blood pressure checked to see those numbers.      03/22/2017 148/60 am   165/85 pm  Pulse 89  03/22/2017 143/75 am  151/78 pm  Pulse 81  03/24/2017 146/72 am   174/85 pm 151/78 Pulse 84  03/25/2017 109/72 am    Pulse 80  03/27/2017 136/75 am    Pulse 81   03/28/2017    163/83 pm  Pulse 89  03/31/2017114/63 am    Pulse 89    Ask RN from [facility] if these were done orthostatically as requested by Neurologist.     Emery currently has HH care with [facility]- Gisela is considering Palliative Care for her dad.  She would like us to talk to Emily LEVINE for [facility].  I left a message for Emily to call me back.        Things that need to be addressed.  Pulmonology appointment- On oxygen but very short of breath at times   Ortho appointment-severe pain in his knees- family would like just pain meds but if ortho appointment needed- would like everything done at one visit-very difficult to get him to appointments.    Palliative care referral-called [facility]   Depression medication concerns the past few months very emotional-tearful at many instances- Neurologist wanted this addressed by PCP.    Neuro appointment follow up due in June     Will discuss all these concerns with [facility] and then try to get all these areas organized.  Gisela said if appointments needed must be in the am.  He does not move well at all in the afternoon.Emily was there last Friday and today.      Emery has been in a lot of pain and last week he said to his daughter that he just can't take the pain anymore, but he could not do anything about it because there was not a gun in the house.  Gisela is really having a hard time with this.     BUT the recent change of oxycodone 5mg  to bid and then they are giving him Tylenol Arthritis twice a day as well.  So the pain is under better control and he is doing so much better emotionally.  He is not having as much pain and his demeanor has even improved.      Kenzie feels that he would qualify for palliative care and would actually possibly qualify for Hospice care.    L:jey will send her recommendations to Dr. SEGURA for him to review.  I am not sure with the new rules for pain medicatons if we would need for patient to come in to be seen with the new regulations or not.      Gisela is also concerned about his blood pressures because neurology was concerned about them.  The readings she sent seemed to be okay.    Will have to see what is decided with family and caregivers.   Awake/Alert/Cooperative